# Patient Record
Sex: FEMALE | Race: BLACK OR AFRICAN AMERICAN | Employment: STUDENT | URBAN - METROPOLITAN AREA
[De-identification: names, ages, dates, MRNs, and addresses within clinical notes are randomized per-mention and may not be internally consistent; named-entity substitution may affect disease eponyms.]

---
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
## 2021-04-09 DIAGNOSIS — Z23 NEED FOR VACCINATION: ICD-10-CM

## (undated) NOTE — MR AVS SNAPSHOT
[facility]  [phone]               Thank you for choosing us for your health care visit with Cresencio Maldonado PA-C.   We are glad to serve you and happy to provide you with [facility] Commonly known as:  VENTOLIN HFA           Fluticasone Furoate 27.5 MCG/SPRAY Susp   2 sprays by Nasal route daily.    Commonly known as:  VERAMYST           JUNEL FE 1.5/30 1.5-30 MG-MCG Tabs   Generic drug:  Norethin Ace-Eth Estrad-FE   Take 1 tablet by m An initiative of the American Academy of Pediatrics    Fact Sheet: Healthy Active Living for Families    Healthy nutrition starts as early as infancy with breastfeeding.  Once your baby begins eating solid foods, introduce nutritious foods early on and ofte

## (undated) NOTE — MR AVS SNAPSHOT
[facility] [place]  [facility]  [phone]               Thank you for choosing us for your health care visit with Lizbet Barrientos PA-C.   We are glad to serve you and happy to provide you with [facility] Scheduling   at [phone].          Reason for Today's Visit     Well Adolescent Exam     Contraception     Fainting           Medical Issues Discussed Today     Migraine with aura and without status migrainosus, not intractable with others in the family? Is he or she respectful of you, other adults, and authority? Does your child participate in family events, or does he or she withdraw from other family members? · Risky behaviors.  Many teenagers are curious about drugs, alcohol, or martial arts classes, riding a bike, or even walking to school or a friend’s house counts as activity.    · Limit “screen time” to 1 hour to 2 hours each day. This includes time spent watching TV, playing video games, using the computer, and texting.  If During the teen years, sleep patterns may change. Many teenagers have a hard time falling asleep, which can lead to sleeping late the next morning.  Here are some tips to help your teen get the rest he or she needs:  · Encourage your teen to keep a consiste · Set rules and limits around driving and use of the car. If your teen gets a ticket or has an accident, there should be consequences. Driving is a privilege that can be taken away if your child doesn’t follow the rules.   · Teach your child to make good de 88003. All rights reserved. This information is not intended as a substitute for professional medical care. Always follow your healthcare professional's instructions.     Catch–up vaccination:    Administer Menactra or Menveo vaccine at age 15 through 25 ye you understand how and when to take each. Commonly known as:  RITALIN LA           * Methylphenidate HCl ER (LA) 30 MG Cp24   Take 1 capsule (30 mg total) by mouth daily. What changed:   You were already taking a medication with the same name, and this Take 4 mg by mouth every 8 (eight) hours as needed for Nausea. Commonly known as:  ZOFRAN-ODT           SUMAtriptan Succinate 50 MG Tabs   TAKE 1 TABLET BY MOUTH EVERY 2 HOURS AS NEEDED FOR MIGRAINE.  MAX 4 TABLETS PER 24 HOURS   Commonly known as:  IMITR

## (undated) NOTE — Clinical Note
ASTHMA ACTION PLAN for Kelsey Cabrera     : 3/9/2000     Date: 2017  Provider:  Bossman Escalante PA-C  Phone for doctor or clinic: [facility], [address] [place] [address], [address] [place] [address]  [phone]

## (undated) NOTE — LETTER
Date: 12/15/2017    Patient Name: Munir Bal          To Whom it may concern: This letter has been written at the patient's request. The above patient was seen at the [facility] for treatment of a medical condition.     The patient ma

## (undated) NOTE — LETTER
ASTHMA ACTION PLAN for Gerda Melvin     : 3/9/2000     Date: 4/3/2018  Provider:  Danielle Duvall PA-C  Phone for doctor or clinic: [facility], [address] [place] [address], [address] [place] [address], [address]  [address]  [phone]